# Patient Record
Sex: MALE | URBAN - METROPOLITAN AREA
[De-identification: names, ages, dates, MRNs, and addresses within clinical notes are randomized per-mention and may not be internally consistent; named-entity substitution may affect disease eponyms.]

---

## 2021-02-09 ENCOUNTER — APPOINTMENT (RX ONLY)
Dept: URBAN - METROPOLITAN AREA CLINIC 176 | Facility: CLINIC | Age: 21
Setting detail: DERMATOLOGY
End: 2021-02-09

## 2021-02-09 VITALS — HEIGHT: 72 IN | WEIGHT: 180 LBS

## 2021-02-09 DIAGNOSIS — B35.2 TINEA MANUUM: ICD-10-CM | Status: INADEQUATELY CONTROLLED

## 2021-02-09 PROBLEM — L08.9 LOCAL INFECTION OF THE SKIN AND SUBCUTANEOUS TISSUE, UNSPECIFIED: Status: ACTIVE | Noted: 2021-02-09

## 2021-02-09 PROCEDURE — ? ADDITIONAL NOTES

## 2021-02-09 PROCEDURE — 99203 OFFICE O/P NEW LOW 30 MIN: CPT

## 2021-02-09 PROCEDURE — ? PRESCRIPTION

## 2021-02-09 RX ORDER — SULCONAZOLE NITRATE 10 MG/G
CREAM TOPICAL
Qty: 1 | Refills: 2 | Status: ERX | COMMUNITY
Start: 2021-02-09

## 2021-02-09 RX ADMIN — SULCONAZOLE NITRATE: 10 CREAM TOPICAL at 00:00

## 2021-02-09 ASSESSMENT — LOCATION SIMPLE DESCRIPTION DERM: LOCATION SIMPLE: RIGHT HAND

## 2021-02-09 ASSESSMENT — LOCATION ZONE DERM: LOCATION ZONE: HAND

## 2021-02-09 ASSESSMENT — LOCATION DETAILED DESCRIPTION DERM: LOCATION DETAILED: RIGHT ULNAR DORSAL HAND

## 2021-02-09 NOTE — PROCEDURE: ADDITIONAL NOTES
Render Risk Assessment In Note?: yes
Additional Notes: Will try antifungal bid x 2 weeks exelderm , if not improving consider steroids topically
Detail Level: Simple

## 2021-03-04 ENCOUNTER — APPOINTMENT (RX ONLY)
Dept: URBAN - METROPOLITAN AREA CLINIC 176 | Facility: CLINIC | Age: 21
Setting detail: DERMATOLOGY
End: 2021-03-04

## 2021-03-04 VITALS — WEIGHT: 180 LBS | HEIGHT: 71 IN

## 2021-03-04 DIAGNOSIS — L30.8 OTHER SPECIFIED DERMATITIS: ICD-10-CM

## 2021-03-04 PROCEDURE — ? PRESCRIPTION

## 2021-03-04 PROCEDURE — ? ADDITIONAL NOTES

## 2021-03-04 PROCEDURE — 99213 OFFICE O/P EST LOW 20 MIN: CPT

## 2021-03-04 RX ORDER — SULCONAZOLE NITRATE 10 MG/G
CREAM TOPICAL
Qty: 1 | Refills: 2 | Status: CANCELLED
Stop reason: ENTERED-IN-ERROR

## 2021-03-04 RX ORDER — BETAMETHASONE DIPROPIONATE 0.5 MG/G
CREAM TOPICAL
Qty: 1 | Refills: 0 | Status: ERX | COMMUNITY
Start: 2021-03-04

## 2021-03-04 RX ADMIN — BETAMETHASONE DIPROPIONATE: 0.5 CREAM TOPICAL at 00:00

## 2021-03-04 ASSESSMENT — LOCATION DETAILED DESCRIPTION DERM: LOCATION DETAILED: RIGHT RADIAL DORSAL HAND

## 2021-03-04 ASSESSMENT — LOCATION ZONE DERM: LOCATION ZONE: HAND

## 2021-03-04 ASSESSMENT — LOCATION SIMPLE DESCRIPTION DERM: LOCATION SIMPLE: RIGHT HAND

## 2021-03-04 NOTE — PROCEDURE: ADDITIONAL NOTES
Render Risk Assessment In Note?: yes
Additional Notes: Did not respond to anti fungal topical, will change to betamethasone dipropionate 0.05%bid x 2 weeks with otc hand cream daily if worsens contact office , recheck in 2-3 weeks
Detail Level: Simple